# Patient Record
Sex: MALE | URBAN - METROPOLITAN AREA
[De-identification: names, ages, dates, MRNs, and addresses within clinical notes are randomized per-mention and may not be internally consistent; named-entity substitution may affect disease eponyms.]

---

## 2021-10-01 ENCOUNTER — OFFICE VISIT (OUTPATIENT)
Dept: URBAN - METROPOLITAN AREA CLINIC 87 | Facility: CLINIC | Age: 76
End: 2021-10-01
Payer: MEDICARE

## 2021-10-01 DIAGNOSIS — D31.32 BENIGN NEOPLASM OF LEFT CHOROID: ICD-10-CM

## 2021-10-01 DIAGNOSIS — E11.3293 TYPE 2 DIAB W MILD NONPRLF DIABETIC RTNOP W/O MACULAR EDEMA, BILATERAL: ICD-10-CM

## 2021-10-01 PROCEDURE — 99204 OFFICE O/P NEW MOD 45 MIN: CPT | Performed by: OPHTHALMOLOGY

## 2021-10-01 PROCEDURE — 92134 CPTRZ OPH DX IMG PST SGM RTA: CPT | Performed by: OPHTHALMOLOGY

## 2021-10-01 ASSESSMENT — INTRAOCULAR PRESSURE
OS: 11
OD: 12

## 2021-10-01 NOTE — IMPRESSION/PLAN
Impression: Age-related nuclear cataract, bilateral: H25.13 Bilateral. Plan: Discussed risk of progression after retina surgery

## 2021-10-01 NOTE — IMPRESSION/PLAN
Impression: Type 2 diab w mild nonprlf diabetic rtnop w/o macular edema, bilateral: X08.7679 Bilateral. Plan: Retinal examination reveals non-proliferative diabetic retinopathy. The diagnosis, natural history, and prognosis of NPDR were discussed at length. The importance of blood sugar, blood pressure, and cholesterol control and their relationship to progression of diabetic retinopathy were reviewed.

## 2021-10-01 NOTE — IMPRESSION/PLAN
Impression: Puckering of macula, right eye: H35.371 Right.
- + PVD

OCT:
OD: ERM, no ME, no MH
OS: flat Plan: Examination and OCT reveals an ERM which is distorting the macular contour. The diagnosis, natural history, and prognosis of epiretinal membranes, as well as the risks and benefits of vitrectomy with membrane peeling versus observation were discussed at length. Given the patient's current visual acuity and hindrance on activities of daily living, surgical correction was recommended. The patient understands that while the distortion should lianne, the final acuity, which can take months to achieve, may or may not be an improvement over baseline. 

PLAN: 25g PPV, ERM-ILM peel OD x ERM

## 2022-03-18 ENCOUNTER — OFFICE VISIT (OUTPATIENT)
Dept: URBAN - METROPOLITAN AREA CLINIC 87 | Facility: CLINIC | Age: 77
End: 2022-03-18
Payer: MEDICARE

## 2022-03-18 DIAGNOSIS — H25.13 AGE-RELATED NUCLEAR CATARACT, BILATERAL: ICD-10-CM

## 2022-03-18 DIAGNOSIS — H35.371 PUCKERING OF MACULA, RIGHT EYE: Primary | ICD-10-CM

## 2022-03-18 DIAGNOSIS — H43.813 VITREOUS DEGENERATION, BILATERAL: ICD-10-CM

## 2022-03-18 PROCEDURE — 92134 CPTRZ OPH DX IMG PST SGM RTA: CPT | Performed by: OPHTHALMOLOGY

## 2022-03-18 PROCEDURE — 99214 OFFICE O/P EST MOD 30 MIN: CPT | Performed by: OPHTHALMOLOGY

## 2022-03-18 ASSESSMENT — INTRAOCULAR PRESSURE
OS: 10
OD: 11

## 2022-03-18 NOTE — IMPRESSION/PLAN
Impression: Type 2 diab w mild nonprlf diabetic rtnop w/o macular edema, bilateral: C26.5256 Bilateral. Plan: Retinal examination reveals non-proliferative diabetic retinopathy. The diagnosis, natural history, and prognosis of NPDR were discussed at length. The importance of blood sugar, blood pressure, and cholesterol control and their relationship to progression of diabetic retinopathy were reviewed.

## 2022-03-18 NOTE — IMPRESSION/PLAN
Impression: Puckering of macula, right eye: H35.371 Right.
- + PVD

OCT: 03/18/22 OD: ERM, no ME, no MH
OS: flat Plan: Examination and OCT reveals an ERM which is distorting the macular contour. The diagnosis, natural history, and prognosis of epiretinal membranes, as well as the risks and benefits of vitrectomy with membrane peeling versus observation were discussed at length. Given the patient's current visual acuity and hindrance on activities of daily living, surgical correction was recommended. The patient understands that while the distortion should lianne, the final acuity, which can take months to achieve, may or may not be an improvement over baseline. 

PLAN: 25g PPV, ERM-ILM peel OD x ERM

## 2022-04-26 ENCOUNTER — Encounter (OUTPATIENT)
Dept: URBAN - METROPOLITAN AREA EXTERNAL CLINIC 14 | Facility: EXTERNAL CLINIC | Age: 77
End: 2022-04-26
Payer: MEDICARE

## 2022-04-26 PROCEDURE — 67042 VIT FOR MACULAR HOLE: CPT | Performed by: OPHTHALMOLOGY

## 2022-04-27 ENCOUNTER — POST-OPERATIVE VISIT (OUTPATIENT)
Dept: URBAN - METROPOLITAN AREA CLINIC 7 | Facility: CLINIC | Age: 77
End: 2022-04-27
Payer: MEDICARE

## 2022-04-27 PROCEDURE — 99024 POSTOP FOLLOW-UP VISIT: CPT | Performed by: OPHTHALMOLOGY

## 2022-04-27 ASSESSMENT — INTRAOCULAR PRESSURE
OS: 7
OD: 9

## 2022-04-27 NOTE — IMPRESSION/PLAN
Impression: S/P 25g PPV, ERM-ILM peel OD x ERM OD - 1 Day. Puckering of macula, right eye  H35.371. Plan: PF/Oflox QID. RTC 1 week DFE OD

## 2022-05-02 ENCOUNTER — POST-OPERATIVE VISIT (OUTPATIENT)
Dept: URBAN - METROPOLITAN AREA CLINIC 86 | Facility: CLINIC | Age: 77
End: 2022-05-02
Payer: MEDICARE

## 2022-05-02 DIAGNOSIS — H35.371 PUCKERING OF MACULA, RIGHT EYE: Primary | ICD-10-CM

## 2022-05-02 PROCEDURE — 99024 POSTOP FOLLOW-UP VISIT: CPT | Performed by: OPHTHALMOLOGY

## 2022-05-02 ASSESSMENT — INTRAOCULAR PRESSURE
OS: 7
OD: 8

## 2022-05-02 NOTE — IMPRESSION/PLAN
Impression: S/P 25g PPV, ERM-ILM peel OD x ERM OD - 6 Days. Puckering of macula, right eye  H35.371. Plan: Taper PF 3-2-1. Stop Oflox.  

RTC 1 month POS DFE/OCT OD

## 2022-06-10 ENCOUNTER — POST-OPERATIVE VISIT (OUTPATIENT)
Dept: URBAN - METROPOLITAN AREA CLINIC 87 | Facility: CLINIC | Age: 77
End: 2022-06-10
Payer: MEDICARE

## 2022-06-10 DIAGNOSIS — H35.371 PUCKERING OF MACULA, RIGHT EYE: Primary | ICD-10-CM

## 2022-06-10 PROCEDURE — 99024 POSTOP FOLLOW-UP VISIT: CPT | Performed by: OPHTHALMOLOGY

## 2022-06-10 ASSESSMENT — INTRAOCULAR PRESSURE
OD: 8
OS: 10

## 2022-06-10 NOTE — IMPRESSION/PLAN
Impression: S/P 25g PPV, ERM-ILM peel OD x ERM OD - 45 Days. Puckering of macula, right eye  H35.371. OCT:
OD: s/p ERM peel OS: WNL Plan: Off drops. [id number] Arianna Little for [employer].  

RTC 3 months DFE OU OCT OU

## 2022-09-16 ENCOUNTER — OFFICE VISIT (OUTPATIENT)
Dept: URBAN - METROPOLITAN AREA CLINIC 87 | Facility: CLINIC | Age: 77
End: 2022-09-16
Payer: MEDICARE

## 2022-09-16 DIAGNOSIS — E11.3293 TYPE 2 DIAB W MILD NONPRLF DIABETIC RTNOP W/O MACULAR EDEMA, BILATERAL: ICD-10-CM

## 2022-09-16 DIAGNOSIS — H35.371 PUCKERING OF MACULA, RIGHT EYE: Primary | ICD-10-CM

## 2022-09-16 PROCEDURE — 92134 CPTRZ OPH DX IMG PST SGM RTA: CPT | Performed by: OPHTHALMOLOGY

## 2022-09-16 PROCEDURE — 99213 OFFICE O/P EST LOW 20 MIN: CPT | Performed by: OPHTHALMOLOGY

## 2022-09-16 ASSESSMENT — INTRAOCULAR PRESSURE
OS: 12
OD: 12

## 2022-09-16 NOTE — IMPRESSION/PLAN
Impression: S/P 25g PPV, ERM-ILM peel OD x ERM OD - 45 Days. Puckering of macula, right eye  H35.371. OCT:
OD: s/p ERM peel OS: WNL Plan: Off drops. [id number] Arianna Little for [employer].  

RTC PRN